# Patient Record
Sex: FEMALE | Race: WHITE | NOT HISPANIC OR LATINO
[De-identification: names, ages, dates, MRNs, and addresses within clinical notes are randomized per-mention and may not be internally consistent; named-entity substitution may affect disease eponyms.]

---

## 2017-07-28 ENCOUNTER — TRANSCRIPTION ENCOUNTER (OUTPATIENT)
Age: 52
End: 2017-07-28

## 2018-05-25 ENCOUNTER — APPOINTMENT (OUTPATIENT)
Dept: ORTHOPEDIC SURGERY | Facility: CLINIC | Age: 53
End: 2018-05-25
Payer: COMMERCIAL

## 2018-05-25 VITALS
SYSTOLIC BLOOD PRESSURE: 125 MMHG | WEIGHT: 116 LBS | HEIGHT: 63 IN | HEART RATE: 80 BPM | DIASTOLIC BLOOD PRESSURE: 83 MMHG | BODY MASS INDEX: 20.55 KG/M2

## 2018-05-25 DIAGNOSIS — Z78.9 OTHER SPECIFIED HEALTH STATUS: ICD-10-CM

## 2018-05-25 DIAGNOSIS — Z82.61 FAMILY HISTORY OF ARTHRITIS: ICD-10-CM

## 2018-05-25 DIAGNOSIS — Z87.891 PERSONAL HISTORY OF NICOTINE DEPENDENCE: ICD-10-CM

## 2018-05-25 PROCEDURE — 97760 ORTHOTIC MGMT&TRAING 1ST ENC: CPT

## 2018-05-25 PROCEDURE — 99204 OFFICE O/P NEW MOD 45 MIN: CPT | Mod: 25

## 2018-05-25 PROCEDURE — 73610 X-RAY EXAM OF ANKLE: CPT | Mod: LT

## 2018-06-12 ENCOUNTER — APPOINTMENT (OUTPATIENT)
Dept: ORTHOPEDIC SURGERY | Facility: CLINIC | Age: 53
End: 2018-06-12
Payer: COMMERCIAL

## 2018-06-12 PROCEDURE — 99214 OFFICE O/P EST MOD 30 MIN: CPT

## 2018-06-18 ENCOUNTER — FORM ENCOUNTER (OUTPATIENT)
Age: 53
End: 2018-06-18

## 2018-06-19 ENCOUNTER — OUTPATIENT (OUTPATIENT)
Dept: OUTPATIENT SERVICES | Facility: HOSPITAL | Age: 53
LOS: 1 days | End: 2018-06-19

## 2018-06-19 ENCOUNTER — OUTPATIENT (OUTPATIENT)
Dept: OUTPATIENT SERVICES | Facility: HOSPITAL | Age: 53
LOS: 1 days | End: 2018-06-19
Payer: COMMERCIAL

## 2018-06-19 ENCOUNTER — APPOINTMENT (OUTPATIENT)
Dept: MRI IMAGING | Facility: CLINIC | Age: 53
End: 2018-06-19
Payer: COMMERCIAL

## 2018-06-19 DIAGNOSIS — Z00.8 ENCOUNTER FOR OTHER GENERAL EXAMINATION: ICD-10-CM

## 2018-06-19 PROCEDURE — 73721 MRI JNT OF LWR EXTRE W/O DYE: CPT | Mod: 26,LT

## 2018-06-19 PROCEDURE — 73721 MRI JNT OF LWR EXTRE W/O DYE: CPT

## 2018-06-28 ENCOUNTER — APPOINTMENT (OUTPATIENT)
Dept: ORTHOPEDIC SURGERY | Facility: CLINIC | Age: 53
End: 2018-06-28
Payer: COMMERCIAL

## 2018-06-28 DIAGNOSIS — S93.402A SPRAIN OF UNSPECIFIED LIGAMENT OF LEFT ANKLE, INITIAL ENCOUNTER: ICD-10-CM

## 2018-06-28 PROCEDURE — 99214 OFFICE O/P EST MOD 30 MIN: CPT

## 2019-08-12 ENCOUNTER — APPOINTMENT (OUTPATIENT)
Dept: ORTHOPEDIC SURGERY | Facility: CLINIC | Age: 54
End: 2019-08-12
Payer: COMMERCIAL

## 2019-08-12 VITALS
HEIGHT: 63 IN | DIASTOLIC BLOOD PRESSURE: 77 MMHG | BODY MASS INDEX: 21.26 KG/M2 | WEIGHT: 120 LBS | SYSTOLIC BLOOD PRESSURE: 132 MMHG | TEMPERATURE: 98.4 F | HEART RATE: 67 BPM

## 2019-08-12 PROCEDURE — 28470 CLTX METATARSAL FX WO MNP EA: CPT | Mod: RT

## 2019-08-12 PROCEDURE — 99214 OFFICE O/P EST MOD 30 MIN: CPT | Mod: 57

## 2019-08-16 NOTE — HISTORY OF PRESENT ILLNESS
[6] : a current pain level of 6/10 [1] : the relief from medicine is 1/10 [7] : the ailment interference is 7/10 [8] : the ailment interference is 8/10 [9] : the ailment interference is 9/10 [10  (interferes completely)] : the ailment interference is10/10 (interferes completely) [de-identified] : She comes in today status post an injury to her right foot over the weekend.  She went [place] a medicenter.  She was pushing off another boat when it moved into them.  She had the onset of pain and swelling.  The patient states the pain is constant and localized.  The patient describes the pain as burning, stinging and throbbing.  The patient states ice and elevation make the symptoms better while walking and standing make the symptoms worse. [] : No [de-identified] : None [de-identified] : Aleve [de-identified] : The patient states she is unstable because she cannot put much weight on right foot.

## 2019-08-16 NOTE — PHYSICAL EXAM
[de-identified] : Left foot:\par Foot: Range of Motion in Degrees:\par 	                                Claimant:	                Normal:	\par Dorsiflexion (Active)	40-degrees	40-degrees	\par Dorsiflexion (Passive)	40-degrees	40-degrees	\par Plantar (Ankle)	                40-degrees	40-degrees	\par Plantar (Passive)	                40-degrees	40-degrees	\par Inversion (Active)	                30-degrees	30-degrees	\par Inversion (Passive)   	30-degrees	30-degrees	\par Eversion  (Active)	                20-degrees	20-degrees	\par Eversion (Passive) 	                20-degrees	20-degrees	\par \par No instability.  No tenderness over the anterolateral ligaments.  No medial-sided tenderness.  No hind, mid or forefoot tenderness.  No proximal fibula tenderness.  Neurologically and vascularly intact distally.  Normal dorsi, plantar flexion, inversion of the foot.  No motor, sensory, reflex or vascular deficits.  2+ DP and PT pulses.  Skin is intact.  No rashes, scars or lesions.\par  \par Right foot:\par Diffuse swelling and tenderness at the base of the second metatarsal.  Limited range of motion secondary to pain.  Skin is intact.  No rashes, scars or lesions.  [de-identified] : Gait: Slightly antalgic to antalgic gait.  Station: Normal  [de-identified] : Appearance: Well-developed, well-nourished female  in no acute distress.  [de-identified] : Outside radiographs reviewed show evidence of widened Lisfranc's joint and nondisplaced fracture base of the second.

## 2019-08-16 NOTE — ADDENDUM
[FreeTextEntry1] : This note was written by Olive Johnson on 08/15/2019 acting as scribe for Francisco Badillo III, MD

## 2019-08-16 NOTE — DISCUSSION/SUMMARY
[de-identified] : The patient presents with a fracture base of the second, right foot.  At this time I recommend a CAM boot, weightbearing as tolerated.  She will be reassessed in two weeks.\par \par I declare responsibility and liability for caring for this fracture for the next 90 days.

## 2019-08-30 ENCOUNTER — APPOINTMENT (OUTPATIENT)
Dept: ORTHOPEDIC SURGERY | Facility: CLINIC | Age: 54
End: 2019-08-30
Payer: COMMERCIAL

## 2019-08-30 VITALS
HEART RATE: 71 BPM | HEIGHT: 63 IN | WEIGHT: 120 LBS | DIASTOLIC BLOOD PRESSURE: 78 MMHG | BODY MASS INDEX: 21.26 KG/M2 | TEMPERATURE: 98.2 F | SYSTOLIC BLOOD PRESSURE: 123 MMHG

## 2019-08-30 PROCEDURE — 73630 X-RAY EXAM OF FOOT: CPT | Mod: TC,RT

## 2019-08-30 PROCEDURE — 99024 POSTOP FOLLOW-UP VISIT: CPT

## 2019-09-09 NOTE — PHYSICAL EXAM
[de-identified] : Right foot: \par Range of motion not assessed secondary to fracture.\par \par \par Right ankle:\par Ankle: Range of Motion in Degrees:\par 	                                Claimant:	                Normal:	\par Dorsiflexion (Active)	40-degrees	40-degrees	\par Dorsiflexion (Passive)	40-degrees	40-degrees	\par Plantar (Active)	                40-degrees	40-degrees	\par Plantar (Passive)	                40-degrees	40-degrees	\par Inversion (Active)	                30-degrees	30-degrees	\par Inversion (Passive)	                30-degrees	30-degrees	\par Eversion  (Active)	                20-degrees	20-degrees	\par Eversion (Passive) 	                20-degrees	20-degrees	\par \par \par No calf tenderness.  Good distal pulses.\par   [de-identified] : X-rays, three views of the right foot, reveal adequate alignment of the fracture.  No new fractures or dislocations noted.

## 2019-09-09 NOTE — HISTORY OF PRESENT ILLNESS
[de-identified] : Magnolia comes in today with a right foot second metatarsal fracture.  The patient states that she is doing well.  She does have some tenderness still.

## 2019-09-09 NOTE — ADDENDUM
[FreeTextEntry1] : This note was written by Olive Johnson on 09/09/2019 acting as scribe for Bibi Sellers, RONNIER/RAMON, PA.\par

## 2019-09-11 PROBLEM — S92.322D: Status: ACTIVE | Noted: 2019-09-11

## 2019-09-12 ENCOUNTER — APPOINTMENT (OUTPATIENT)
Dept: ORTHOPEDIC SURGERY | Facility: CLINIC | Age: 54
End: 2019-09-12
Payer: COMMERCIAL

## 2019-09-12 DIAGNOSIS — S92.322D DISPLACED FRACTURE OF SECOND METATARSAL BONE, LEFT FOOT, SUBSEQUENT ENCOUNTER FOR FRACTURE WITH ROUTINE HEALING: ICD-10-CM

## 2019-09-19 ENCOUNTER — APPOINTMENT (OUTPATIENT)
Dept: ORTHOPEDIC SURGERY | Facility: CLINIC | Age: 54
End: 2019-09-19
Payer: COMMERCIAL

## 2019-09-19 ENCOUNTER — TRANSCRIPTION ENCOUNTER (OUTPATIENT)
Age: 54
End: 2019-09-19

## 2019-09-19 VITALS
SYSTOLIC BLOOD PRESSURE: 128 MMHG | HEART RATE: 75 BPM | BODY MASS INDEX: 21.26 KG/M2 | HEIGHT: 63 IN | TEMPERATURE: 97.9 F | DIASTOLIC BLOOD PRESSURE: 75 MMHG | WEIGHT: 120 LBS

## 2019-09-19 DIAGNOSIS — S92.324A NONDISPLACED FRACTURE OF SECOND METATARSAL BONE, RIGHT FOOT, INITIAL ENCOUNTER FOR CLOSED FRACTURE: ICD-10-CM

## 2019-09-19 PROCEDURE — 99024 POSTOP FOLLOW-UP VISIT: CPT

## 2019-09-19 PROCEDURE — 73620 X-RAY EXAM OF FOOT: CPT | Mod: RT

## 2019-09-26 NOTE — DISCUSSION/SUMMARY
[de-identified] : At this time, since the patient is status post fracture of second metatarsal of right foot, she will wean off the brace and be reassessed in 4-6 weeks.\par

## 2019-09-26 NOTE — ADDENDUM
[FreeTextEntry1] : This note was written by Sha Soler on 09/25/2019, acting as a scribe for Francisco Badillo III, MD

## 2019-09-26 NOTE — PHYSICAL EXAM
[de-identified] : Radiographs, two views of the right foot, show acceptable position. [de-identified] : Right Foot:  Mildly tender over the second metatarsal.

## 2019-09-26 NOTE — HISTORY OF PRESENT ILLNESS
[de-identified] : The patient comes in today for her right foot.  She states she is still having some pain, but feeling much better.

## 2020-09-21 DIAGNOSIS — M47.816 SPONDYLOSIS W/OUT MYELOPATHY OR RADICULOPATHY, LUMBAR REGION: ICD-10-CM

## 2020-09-22 ENCOUNTER — APPOINTMENT (OUTPATIENT)
Dept: ORTHOPEDIC SURGERY | Facility: CLINIC | Age: 55
End: 2020-09-22

## 2020-10-13 NOTE — DISCUSSION/SUMMARY
[de-identified] : The patient presents with a closed fracture of the second metatarsal, right foot.  The patient is advised to keep the boot on and return to the office in two weeks.   64

## 2023-01-05 ENCOUNTER — APPOINTMENT (OUTPATIENT)
Dept: ORTHOPEDIC SURGERY | Facility: CLINIC | Age: 58
End: 2023-01-05
Payer: COMMERCIAL

## 2023-01-05 VITALS — HEIGHT: 63 IN | WEIGHT: 120 LBS | BODY MASS INDEX: 21.26 KG/M2

## 2023-01-05 PROCEDURE — 73562 X-RAY EXAM OF KNEE 3: CPT | Mod: 50

## 2023-01-05 PROCEDURE — 99203 OFFICE O/P NEW LOW 30 MIN: CPT

## 2023-01-05 RX ORDER — NAPROXEN 500 MG/1
500 TABLET ORAL
Qty: 20 | Refills: 0 | Status: COMPLETED | COMMUNITY
Start: 2018-05-21 | End: 2023-01-05

## 2023-01-05 RX ORDER — FLUCONAZOLE 150 MG/1
150 TABLET ORAL
Qty: 2 | Refills: 0 | Status: COMPLETED | COMMUNITY
Start: 2018-02-09 | End: 2023-01-05

## 2023-01-05 NOTE — HISTORY OF PRESENT ILLNESS
[Gradual] : gradual [8] : 8 [1] : 2 [Sharp] : sharp [Stabbing] : stabbing [Intermittent] : intermittent [Leisure] : leisure [Rest] : rest [Exercising] : exercising [Stairs] : stairs [Full time] : Work status: full time [de-identified] : Patient is a 57-year-old female with chronic bilateral knee pain.  She has history of Oats procedure with allograft right knee in 2013.  She is active.  She plays tennis several days a week, and singles and doubles.  Over the past several months she's had increased pain in both her knees.  She has mild to moderate pain standing and walking.  Moderate sharp pain in her right knee with sudden movements.  Pain in her left knee awakens her from sleep at night.  No swelling.  Taking Aleve p.r.n.  Referred by her friend Jerel Kaye for evaluation [] : Post Surgical Visit: no [de-identified] : 2013 [de-identified] : Dr Leif Lee  [de-identified] :

## 2023-01-05 NOTE — DISCUSSION/SUMMARY
[de-identified] : The patient will have MRIs of both knees\par I discussed possible Gelsyn injections which may be the preferred brand of her insurance company. I gave her a pamphlet. Risks benefits and the alternatives were discussed. I also discussed possible TriVisc injections and gave her a pamphlet if her insurance will not cover the injections\par She would like to try the Gelsyn injections were TriVisc injections if Hyaluronate injections are not apporoved by her insurance\par Ice p.r.n.\par Aleve p.r.n.\par Continue home exercises\par \par Impression:\par Mild to moderate osteoarthritis right knee/chondromalacia patella \par Mild to moderate osteoarthritis left knee/chondromalacia patella\par \par \par

## 2023-01-08 ENCOUNTER — FORM ENCOUNTER (OUTPATIENT)
Age: 58
End: 2023-01-08

## 2023-01-09 ENCOUNTER — APPOINTMENT (OUTPATIENT)
Dept: MRI IMAGING | Facility: CLINIC | Age: 58
End: 2023-01-09
Payer: COMMERCIAL

## 2023-01-09 PROCEDURE — 73721 MRI JNT OF LWR EXTRE W/O DYE: CPT | Mod: RT

## 2023-01-09 PROCEDURE — 73721 MRI JNT OF LWR EXTRE W/O DYE: CPT | Mod: LT

## 2023-01-11 ENCOUNTER — NON-APPOINTMENT (OUTPATIENT)
Age: 58
End: 2023-01-11

## 2023-01-16 ENCOUNTER — APPOINTMENT (OUTPATIENT)
Dept: ORTHOPEDIC SURGERY | Facility: CLINIC | Age: 58
End: 2023-01-16
Payer: COMMERCIAL

## 2023-01-16 VITALS — HEIGHT: 63 IN | WEIGHT: 120 LBS | BODY MASS INDEX: 21.26 KG/M2

## 2023-01-16 PROCEDURE — 99214 OFFICE O/P EST MOD 30 MIN: CPT

## 2023-01-16 NOTE — DISCUSSION/SUMMARY
[Medication Risks Reviewed] : Medication risks reviewed [de-identified] : MRIs both knees were discussed with the patient\par Again, I discussed possible Gelsyn injections which may be the preferred brand of her insurance company. Risks benefits and the alternatives were discussed. I also discussed possible TriVisc injections if her insurance will not cover the injections\par She would like to try the Gelsyn injections or TriVisc injections if Hyaluronate injections are not apporoved by her insurance\par Ice p.r.n.\par Discontinue Aleve.  Try meloxicam 15 mg q.d. with food p.r.n.\par Continue home exercises\par Recommend she cut back on her sports until feeling better\par \par Impression:\par Mild to moderate osteoarthritis right knee/chondromalacia patella \par Mild to moderate osteoarthritis left knee/chondromalacia patella/tear medial meniscus\par \par \par

## 2023-01-16 NOTE — DATA REVIEWED
[MRI] : MRI [Bilateral] : of the bilateral [Knee] : knee [I independently reviewed and interpreted images and report] : I independently reviewed and interpreted images and report [FreeTextEntry1] : MRI right knee in January 9, 2023 was reviewed. There are degenerative changes of the medial compartment and patellofemoral joint. Irregularity posterior horn medial meniscus, postsurgical changes versus new tear. Mild subchondral edema medial femoral condyle.\par \par MRI left knee done January 9, 2023 was reviewed. There are tricompartmental degenerative changes most significant in the medial compartment. Complex tear medial meniscus \par

## 2023-01-16 NOTE — IMAGING
[de-identified] : Normal gait\par \par Right knee\par No swelling\par Mild medial facet and joint line tenderness\par Passive range of motion 0° to 125°\par Ligaments are stable\par Quad strength 5/5\par \par Left knee\par No swelling\par Mild medial facet and joint line tenderness\par Passive range of motion 0° to 125°\par Ligaments are stable\par Quad strength 5/5\par \par Both legs\par No swelling\par Calves are soft and nontender\par Posterior tibial pulse 2+ bilaterally

## 2023-01-16 NOTE — HISTORY OF PRESENT ILLNESS
[Gradual] : gradual [3] : 3 [Dull/Aching] : dull/aching [Intermittent] : intermittent [Standing] : standing [Walking] : walking [Stairs] : stairs [Lying in bed] : lying in bed [Full time] : Work status: full time [de-identified] : The patient has continued pain in both knees.  She has mild pain standing and walking.  Mild pain after tennis.  Her most significant issue is pain at night time.  Occasional awakening.  Left knee bothers her more than the right.  Taking Aleve p.r.n. she had MRIs both knees [] : Post Surgical Visit: no [de-identified] : 01/05/23 [de-identified] : Dr. Payan [de-identified] : MRI

## 2023-02-09 ENCOUNTER — APPOINTMENT (OUTPATIENT)
Dept: ORTHOPEDIC SURGERY | Facility: CLINIC | Age: 58
End: 2023-02-09
Payer: COMMERCIAL

## 2023-02-09 VITALS — BODY MASS INDEX: 21.26 KG/M2 | HEIGHT: 63 IN | WEIGHT: 120 LBS

## 2023-02-09 PROCEDURE — 20611 DRAIN/INJ JOINT/BURSA W/US: CPT | Mod: 50

## 2023-02-09 RX ORDER — HYALURONATE SODIUM 16.8MG/2ML
16.8 SYRINGE (ML) INTRAARTICULAR
Refills: 0 | Status: COMPLETED | OUTPATIENT
Start: 2023-02-09

## 2023-02-09 RX ADMIN — Medication MG/2ML: at 00:00

## 2023-02-09 NOTE — HISTORY OF PRESENT ILLNESS
[Gradual] : gradual [3] : 3 [Dull/Aching] : dull/aching [Intermittent] : intermittent [Rest] : rest [Stairs] : stairs [Full time] : Work status: full time [1] : 1 [Gelsyn] : Gelsyn [de-identified] : The patient has continued mild pain in both knees standing and walking.  Mild to moderate pain after tennis.  Pain sleeping has subsided since she has been taking meloxicam [] : Post Surgical Visit: no [de-identified] : 1/16/2023 [de-identified] : Dr. Payan  [de-identified] :

## 2023-02-09 NOTE — PROCEDURE
[Large Joint Injection] : Large joint injection [Bilateral] : bilaterally of the [Knee] : knee [Pain] : pain [Alcohol] : alcohol [Betadine] : betadine [Ethyl Chloride sprayed topically] : ethyl chloride sprayed topically [Sterile technique used] : sterile technique used [Gel-Syn (16.8mg)] : 16.8mg of Gel-Syn [#1] : series #1 [] : Patient tolerated procedure well [Patient was advised to rest the joint(s) for ____ days] : patient was advised to rest the joint(s) for [unfilled] days [Risks, benefits, alternatives discussed / Verbal consent obtained] : the risks benefits, and alternatives have been discussed, and verbal consent was obtained [All ultrasound images have been permanently captured and stored accordingly in our picture archiving and communication system] : All ultrasound images have been permanently captured and stored accordingly in our picture archiving and communication system [de-identified] : To ensure intra-articular injections [FreeTextEntry3] : Do not submerge underwater for 24 hours

## 2023-02-09 NOTE — DISCUSSION/SUMMARY
[Medication Risks Reviewed] : Medication risks reviewed [de-identified] : Gelsyn injections were approved by her insurance company.  Risk benefits and alternatives were discussed\par Ice p.r.n.\par Continue meloxicam 15 mg q.d. with food p.r.n.\par Continue home exercises\par Recommend she cut back on her sports until feeling better\par \par Impression:\par Mild to moderate osteoarthritis right knee/chondromalacia patella \par Mild to moderate osteoarthritis left knee/chondromalacia patella/tear medial meniscus\par \par \par

## 2023-02-16 ENCOUNTER — APPOINTMENT (OUTPATIENT)
Dept: ORTHOPEDIC SURGERY | Facility: CLINIC | Age: 58
End: 2023-02-16
Payer: COMMERCIAL

## 2023-02-16 VITALS — HEIGHT: 63 IN | BODY MASS INDEX: 21.26 KG/M2 | WEIGHT: 120 LBS

## 2023-02-16 PROCEDURE — 20611 DRAIN/INJ JOINT/BURSA W/US: CPT | Mod: 50

## 2023-02-16 RX ORDER — HYALURONATE SODIUM 16.8MG/2ML
16.8 SYRINGE (ML) INTRAARTICULAR
Refills: 0 | Status: COMPLETED | OUTPATIENT
Start: 2023-02-16

## 2023-02-16 RX ADMIN — Medication MG/2ML: at 00:00

## 2023-02-16 NOTE — PROCEDURE
[Large Joint Injection] : Large joint injection [Bilateral] : bilaterally of the [Knee] : knee [Pain] : pain [Alcohol] : alcohol [Betadine] : betadine [Ethyl Chloride sprayed topically] : ethyl chloride sprayed topically [Sterile technique used] : sterile technique used [Gel-Syn (16.8mg)] : 16.8mg of Gel-Syn [#2] : series #2 [] : Patient tolerated procedure well [Patient was advised to rest the joint(s) for ____ days] : patient was advised to rest the joint(s) for [unfilled] days [Risks, benefits, alternatives discussed / Verbal consent obtained] : the risks benefits, and alternatives have been discussed, and verbal consent was obtained [All ultrasound images have been permanently captured and stored accordingly in our picture archiving and communication system] : All ultrasound images have been permanently captured and stored accordingly in our picture archiving and communication system [de-identified] : To ensure intra-articular injections [FreeTextEntry3] : Do not submerge underwater for 24 hours

## 2023-02-16 NOTE — IMAGING
[de-identified] : Normal gait\par \par Right knee\par No swelling\par Mild medial facet and joint line tenderness\par Passive range of motion 0° to 125°\par \par Left knee\par No swelling\par Mild medial facet and joint line tenderness\par Passive range of motion 0° to 125°\par \par Both legs\par No swelling\par Calves are soft and nontender\par

## 2023-02-16 NOTE — DISCUSSION/SUMMARY
[Medication Risks Reviewed] : Medication risks reviewed [de-identified] : Ice p.r.n.\par Continue meloxicam 15 mg q.d. with food p.r.n.\par Continue home exercises\par Recommend she cut back on her sports until feeling better\par She is going to visit her son in [place], [place] next week\par \par Impression:\par Mild to moderate osteoarthritis right knee/chondromalacia patella \par Mild to moderate osteoarthritis left knee/chondromalacia patella/tear medial meniscus\par \par \par

## 2023-02-16 NOTE — HISTORY OF PRESENT ILLNESS
[Gradual] : gradual [3] : 3 [Dull/Aching] : dull/aching [Rest] : rest [Stairs] : stairs [Full time] : Work status: full time [de-identified] : The patient has continued pain in both knees.  Mild to moderate pain after playing tennis.  Mild pain standing and walking intermittently.  No change after the first Gelsyn injections [] : Post Surgical Visit: no [de-identified] :

## 2023-02-28 ENCOUNTER — APPOINTMENT (OUTPATIENT)
Dept: ORTHOPEDIC SURGERY | Facility: CLINIC | Age: 58
End: 2023-02-28
Payer: COMMERCIAL

## 2023-02-28 VITALS — HEIGHT: 63 IN | BODY MASS INDEX: 21.26 KG/M2 | WEIGHT: 120 LBS

## 2023-02-28 PROCEDURE — 20611 DRAIN/INJ JOINT/BURSA W/US: CPT | Mod: 50

## 2023-02-28 RX ORDER — HYALURONATE SODIUM 16.8MG/2ML
16.8 SYRINGE (ML) INTRAARTICULAR
Refills: 0 | Status: COMPLETED | OUTPATIENT
Start: 2023-02-28

## 2023-02-28 RX ADMIN — Medication MG/2ML: at 00:00

## 2023-02-28 NOTE — HISTORY OF PRESENT ILLNESS
[Gradual] : gradual [Dull/Aching] : dull/aching [Intermittent] : intermittent [Stairs] : stairs [Full time] : Work status: full time [3] : 3 [Gelsyn] : Gelsyn [de-identified] : The patient has continued pain in both knees.  Mild pain standing and walking.  Mild to moderate pain after tennis.  No change after the second Gelsyn injections [] : Post Surgical Visit: no [de-identified] :   [de-identified] : 2/16/2023

## 2023-02-28 NOTE — DISCUSSION/SUMMARY
[Medication Risks Reviewed] : Medication risks reviewed [de-identified] : Ice p.r.n.\par Continue meloxicam 15 mg q.d. with food p.r.n.\par Continue home exercises\par Recommend she cut back on her sports until feeling better\par Followup in 2 months if persistent problems, otherwise p.r.n.\par \par Impression:\par Mild to moderate osteoarthritis right knee/chondromalacia patella \par Mild to moderate osteoarthritis left knee/chondromalacia patella/tear medial meniscus\par \par \par

## 2023-02-28 NOTE — PROCEDURE
[Large Joint Injection] : Large joint injection [Bilateral] : bilaterally of the [Knee] : knee [Pain] : pain [Alcohol] : alcohol [Betadine] : betadine [Ethyl Chloride sprayed topically] : ethyl chloride sprayed topically [Sterile technique used] : sterile technique used [Gel-Syn (16.8mg)] : 16.8mg of Gel-Syn [] : Patient tolerated procedure well [Patient was advised to rest the joint(s) for ____ days] : patient was advised to rest the joint(s) for [unfilled] days [Risks, benefits, alternatives discussed / Verbal consent obtained] : the risks benefits, and alternatives have been discussed, and verbal consent was obtained [All ultrasound images have been permanently captured and stored accordingly in our picture archiving and communication system] : All ultrasound images have been permanently captured and stored accordingly in our picture archiving and communication system [#3] : series #3 [de-identified] : To ensure intra-articular injections [FreeTextEntry3] : Do not submerge underwater for 24 hours

## 2023-02-28 NOTE — IMAGING
[de-identified] : Normal gait\par \par Right knee\par No swelling\par Mild medial facet and joint line tenderness\par Passive range of motion 0° to 125°\par \par Left knee\par No swelling\par Mild medial facet and joint line tenderness\par Passive range of motion 0° to 125°\par \par Both legs\par No swelling\par Calves are soft and nontender\par

## 2023-03-22 ENCOUNTER — RX RENEWAL (OUTPATIENT)
Age: 58
End: 2023-03-22

## 2023-05-24 ENCOUNTER — RX RENEWAL (OUTPATIENT)
Age: 58
End: 2023-05-24

## 2023-08-29 NOTE — IMAGING
[de-identified] : Normal gait\par \par Right knee\par No swelling\par Mild medial facet and joint line tenderness\par Passive range of motion 0° to 125°\par \par Left knee\par No swelling\par Mild medial facet and joint line tenderness\par Passive range of motion 0° to 125°\par \par Both legs\par No swelling\par Calves are soft and nontender\par  Wound Care: Petrolatum

## 2023-10-02 ENCOUNTER — APPOINTMENT (OUTPATIENT)
Dept: ORTHOPEDIC SURGERY | Facility: CLINIC | Age: 58
End: 2023-10-02
Payer: COMMERCIAL

## 2023-10-02 VITALS — BODY MASS INDEX: 21.26 KG/M2 | WEIGHT: 120 LBS | HEIGHT: 63 IN

## 2023-10-02 DIAGNOSIS — S83.232D COMPLEX TEAR OF MEDIAL MENISCUS, CURRENT INJURY, LEFT KNEE, SUBSEQUENT ENCOUNTER: ICD-10-CM

## 2023-10-02 PROCEDURE — 99214 OFFICE O/P EST MOD 30 MIN: CPT

## 2023-10-02 RX ORDER — MELOXICAM 15 MG/1
15 TABLET ORAL
Qty: 30 | Refills: 1 | Status: COMPLETED | COMMUNITY
Start: 2023-01-16 | End: 2023-10-02

## 2023-10-05 ENCOUNTER — APPOINTMENT (OUTPATIENT)
Dept: MRI IMAGING | Facility: CLINIC | Age: 58
End: 2023-10-05
Payer: COMMERCIAL

## 2023-10-05 PROCEDURE — 73721 MRI JNT OF LWR EXTRE W/O DYE: CPT | Mod: RT

## 2023-10-09 ENCOUNTER — NON-APPOINTMENT (OUTPATIENT)
Age: 58
End: 2023-10-09

## 2023-10-12 ENCOUNTER — APPOINTMENT (OUTPATIENT)
Dept: ORTHOPEDIC SURGERY | Facility: CLINIC | Age: 58
End: 2023-10-12
Payer: COMMERCIAL

## 2023-10-12 VITALS — WEIGHT: 120 LBS | BODY MASS INDEX: 21.26 KG/M2 | HEIGHT: 63 IN

## 2023-10-12 DIAGNOSIS — M17.11 UNILATERAL PRIMARY OSTEOARTHRITIS, RIGHT KNEE: ICD-10-CM

## 2023-10-12 DIAGNOSIS — R93.6 ABNORMAL FINDINGS ON DIAGNOSTIC IMAGING OF LIMBS: ICD-10-CM

## 2023-10-12 DIAGNOSIS — M22.41 CHONDROMALACIA PATELLAE, RIGHT KNEE: ICD-10-CM

## 2023-10-12 DIAGNOSIS — M22.42 CHONDROMALACIA PATELLAE, LEFT KNEE: ICD-10-CM

## 2023-10-12 DIAGNOSIS — M76.31 ILIOTIBIAL BAND SYNDROME, RIGHT LEG: ICD-10-CM

## 2023-10-12 PROCEDURE — 99214 OFFICE O/P EST MOD 30 MIN: CPT

## 2023-10-17 RX ORDER — HYALURONATE SODIUM 16.8MG/2ML
16.8 SYRINGE (ML) INTRAARTICULAR
Qty: 6 | Refills: 0 | Status: ACTIVE | OUTPATIENT
Start: 2023-10-17

## 2023-10-30 ENCOUNTER — APPOINTMENT (OUTPATIENT)
Dept: ORTHOPEDIC SURGERY | Facility: CLINIC | Age: 58
End: 2023-10-30
Payer: COMMERCIAL

## 2023-10-30 VITALS — HEIGHT: 63 IN | BODY MASS INDEX: 21.26 KG/M2 | WEIGHT: 120 LBS

## 2023-10-30 PROCEDURE — 99213 OFFICE O/P EST LOW 20 MIN: CPT | Mod: 25

## 2023-10-30 PROCEDURE — 20611 DRAIN/INJ JOINT/BURSA W/US: CPT | Mod: 50

## 2023-10-30 RX ORDER — HYALURONATE SODIUM 16.8MG/2ML
16.8 SYRINGE (ML) INTRAARTICULAR
Refills: 0 | Status: COMPLETED | OUTPATIENT
Start: 2023-10-30

## 2023-10-30 RX ADMIN — Medication MG/2ML: at 00:00

## 2023-11-07 ENCOUNTER — APPOINTMENT (OUTPATIENT)
Dept: ORTHOPEDIC SURGERY | Facility: CLINIC | Age: 58
End: 2023-11-07
Payer: COMMERCIAL

## 2023-11-07 VITALS — BODY MASS INDEX: 21.26 KG/M2 | WEIGHT: 120 LBS | HEIGHT: 63 IN

## 2023-11-07 PROCEDURE — 20611 DRAIN/INJ JOINT/BURSA W/US: CPT | Mod: 50

## 2023-11-07 RX ORDER — HYALURONATE SODIUM 16.8MG/2ML
16.8 SYRINGE (ML) INTRAARTICULAR
Refills: 0 | Status: COMPLETED | OUTPATIENT
Start: 2023-11-07

## 2023-11-07 RX ADMIN — Medication MG/2ML: at 00:00

## 2023-11-16 ENCOUNTER — APPOINTMENT (OUTPATIENT)
Dept: ORTHOPEDIC SURGERY | Facility: CLINIC | Age: 58
End: 2023-11-16
Payer: COMMERCIAL

## 2023-11-16 VITALS — WEIGHT: 120 LBS | HEIGHT: 63 IN | BODY MASS INDEX: 21.26 KG/M2

## 2023-11-16 PROCEDURE — 20611 DRAIN/INJ JOINT/BURSA W/US: CPT | Mod: 50

## 2023-11-16 RX ORDER — HYALURONATE SODIUM 16.8MG/2ML
16.8 SYRINGE (ML) INTRAARTICULAR
Refills: 0 | Status: COMPLETED | OUTPATIENT
Start: 2023-11-16

## 2023-11-16 RX ADMIN — Medication MG/2ML: at 00:00

## 2023-12-01 ENCOUNTER — EMERGENCY (EMERGENCY)
Facility: HOSPITAL | Age: 58
LOS: 0 days | Discharge: HOME | End: 2023-12-02
Attending: EMERGENCY MEDICINE
Payer: COMMERCIAL

## 2023-12-01 VITALS
HEART RATE: 79 BPM | TEMPERATURE: 98 F | HEIGHT: 65 IN | DIASTOLIC BLOOD PRESSURE: 80 MMHG | WEIGHT: 130.07 LBS | OXYGEN SATURATION: 100 % | SYSTOLIC BLOOD PRESSURE: 143 MMHG | RESPIRATION RATE: 16 BRPM
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
## 2023-12-01 DIAGNOSIS — S01.81XA LACERATION WITHOUT FOREIGN BODY OF OTHER PART OF HEAD, INITIAL ENCOUNTER: ICD-10-CM

## 2023-12-01 DIAGNOSIS — Z23 ENCOUNTER FOR IMMUNIZATION: ICD-10-CM

## 2023-12-01 DIAGNOSIS — R51.9 HEADACHE, UNSPECIFIED: ICD-10-CM

## 2023-12-01 DIAGNOSIS — S06.0X0A CONCUSSION WITHOUT LOSS OF CONSCIOUSNESS, INITIAL ENCOUNTER: ICD-10-CM

## 2023-12-01 PROCEDURE — 70450 CT HEAD/BRAIN W/O DYE: CPT | Mod: MA

## 2023-12-01 PROCEDURE — 73130 X-RAY EXAM OF HAND: CPT | Mod: RT

## 2023-12-01 PROCEDURE — 70450 CT HEAD/BRAIN W/O DYE: CPT | Mod: 26,MA

## 2023-12-01 PROCEDURE — 72125 CT NECK SPINE W/O DYE: CPT | Mod: MA

## 2023-12-01 PROCEDURE — 73140 X-RAY EXAM OF FINGER(S): CPT | Mod: RT

## 2023-12-01 PROCEDURE — 73130 X-RAY EXAM OF HAND: CPT | Mod: 26,RT

## 2023-12-01 PROCEDURE — 73140 X-RAY EXAM OF FINGER(S): CPT | Mod: 26,59,RT

## 2023-12-01 PROCEDURE — 90471 IMMUNIZATION ADMIN: CPT

## 2023-12-01 PROCEDURE — 12013 RPR F/E/E/N/L/M 2.6-5.0 CM: CPT

## 2023-12-01 PROCEDURE — 99284 EMERGENCY DEPT VISIT MOD MDM: CPT | Mod: 25

## 2023-12-01 PROCEDURE — 90715 TDAP VACCINE 7 YRS/> IM: CPT

## 2023-12-01 PROCEDURE — 72125 CT NECK SPINE W/O DYE: CPT | Mod: 26,MA

## 2023-12-01 RX ORDER — ACETAMINOPHEN 500 MG
975 TABLET ORAL ONCE
Refills: 0 | Status: COMPLETED | OUTPATIENT
Start: 2023-12-01 | End: 2023-12-01

## 2023-12-01 RX ORDER — TETANUS TOXOID, REDUCED DIPHTHERIA TOXOID AND ACELLULAR PERTUSSIS VACCINE, ADSORBED 5; 2.5; 8; 8; 2.5 [IU]/.5ML; [IU]/.5ML; UG/.5ML; UG/.5ML; UG/.5ML
0.5 SUSPENSION INTRAMUSCULAR ONCE
Refills: 0 | Status: COMPLETED | OUTPATIENT
Start: 2023-12-01 | End: 2023-12-01

## 2023-12-01 RX ADMIN — TETANUS TOXOID, REDUCED DIPHTHERIA TOXOID AND ACELLULAR PERTUSSIS VACCINE, ADSORBED 0.5 MILLILITER(S): 5; 2.5; 8; 8; 2.5 SUSPENSION INTRAMUSCULAR at 23:10

## 2023-12-01 RX ADMIN — Medication 975 MILLIGRAM(S): at 23:08

## 2023-12-01 NOTE — ED ADULT TRIAGE NOTE - CHIEF COMPLAINT QUOTE
Pt BIBEMS s/p fall after playing tennis. Pt states she collided with another player, they bumped hips, and she fell to the ground. Endorsed head strike, +LOC. Laceration noted to L eyebrow and R wrist. C/O R wrist pain. Denies blood thinners. MD Arora in [facility] [place] assessing patient. NA called 2040. Brought directly to CT.

## 2023-12-01 NOTE — ED PROVIDER NOTE - PROGRESS NOTE DETAILS
concussion talk given acetaminophen for pain wound care precautions given patient and patient's  agree to plan of care

## 2023-12-01 NOTE — ED PROVIDER NOTE - CLINICAL SUMMARY MEDICAL DECISION MAKING FREE TEXT BOX
patient feeling better ambulating no acute distress concussion talk given return to ED for intractable headache persistent vomiting new onset motor or sensory deficits

## 2023-12-01 NOTE — ED PROVIDER NOTE - ENDOCRINE NEGATIVE STATEMENT, MLM
[TextEntry] : Chest x-ray from 12/2/14 and 10/11/21 were negative by report.  Home PSG from 9/24/21 revealed mild LAYO with an AHI of 9.5 but with significant desaturation.
no diabetes and no thyroid trouble.

## 2023-12-01 NOTE — ED PROVIDER NOTE - PATIENT PORTAL LINK FT
You can access the FollowMyHealth Patient Portal offered by [facility] by registering at the following website: [email or web address]. By joining [employer]’s FollowMyHealth portal, you will also be able to view your health information using other applications (apps) compatible with our system. You can access the FollowMyHealth Patient Portal offered by [facility] by registering at the following website: [email or web address]. By joining [employer]’s FollowMyHealth portal, you will also be able to view your health information using other applications (apps) compatible with our system. You can access the FollowMyHealth Patient Portal offered by [facility] by registering at the following website: [email or web address]. By joining [employer]’s FollowMyHealth portal, you will also be able to view your health information using other applications (apps) compatible with our system.

## 2023-12-01 NOTE — ED PROVIDER NOTE - OBJECTIVE STATEMENT
Patient presents to ED status post head collision while playing tennis complaining of generalized achy nondraining headache also with left frontal forehead laceration.  No blood thinners.  No vomiting or loss of consciousness.  No chest pain or shortness of breath headache generalized achy nonradiating no abdominal pain no motor or sensory deficits acting appropriate per

## 2023-12-02 NOTE — ED PROCEDURE NOTE - CPROC ED TIME OUT STATEMENT1
“Patient's name, , procedure and correct site were confirmed during the [place] Timeout.” “Patient's name, , procedure and correct site were confirmed during the [place] Timeout.” “Patient's name, , procedure and correct site were confirmed during the [place] Timeout.”

## 2023-12-02 NOTE — ED ADULT NURSE NOTE - CHIEF COMPLAINT QUOTE
Pt BIBEMS s/p fall after playing tennis. Pt states she collided with another player, they bumped hips, and she fell to the ground. Endorsed head strike, +LOC. Laceration noted to L eyebrow and R wrist. C/O R wrist pain. Denies blood thinners. MD Arora in [facility] [place] assessing patient. NA called 2040. Brought directly to CT.

## 2024-05-09 ENCOUNTER — APPOINTMENT (OUTPATIENT)
Dept: ORTHOPEDIC SURGERY | Facility: CLINIC | Age: 59
End: 2024-05-09
Payer: COMMERCIAL

## 2024-05-09 VITALS — HEIGHT: 63 IN | BODY MASS INDEX: 21.26 KG/M2 | WEIGHT: 120 LBS

## 2024-05-09 DIAGNOSIS — M17.0 BILATERAL PRIMARY OSTEOARTHRITIS OF KNEE: ICD-10-CM

## 2024-05-09 DIAGNOSIS — M76.892 OTHER SPECIFIED ENTHESOPATHIES OF LEFT LOWER LIMB, EXCLUDING FOOT: ICD-10-CM

## 2024-05-09 PROCEDURE — 99214 OFFICE O/P EST MOD 30 MIN: CPT | Mod: 25

## 2024-05-09 PROCEDURE — 73562 X-RAY EXAM OF KNEE 3: CPT | Mod: 50

## 2024-05-09 PROCEDURE — 73502 X-RAY EXAM HIP UNI 2-3 VIEWS: CPT

## 2024-05-09 RX ORDER — MELOXICAM 15 MG/1
15 TABLET ORAL
Qty: 30 | Refills: 1 | Status: ACTIVE | COMMUNITY
Start: 2024-05-09 | End: 1900-01-01

## 2024-05-09 NOTE — IMAGING
[de-identified] : Normal gait  Lumbosacral spine Inspection-normal Tenderness-no tenderness Straight leg raising-negative bilaterally  Right hip Full painless passive range of motion. No tenderness  Left hip Full painless passive range of motion Mild tenderness over the abductor insertion Abductors 4+/5  Both legs No swelling Calves are soft and nontender Posterior tibial pulse 2+ bilaterally  Right knee No swelling Mild medial facet and joint line tenderness.  Mild lateral joint line tenderness.   Passive range of motion 0 to 125 degrees Ligaments are stable Quad strength 5/5  Left knee No swelling Mild medial facet.  Mild medial lateral joint line tenderness Passive range of motion 0 to 125 degrees Ligaments are stable Quad strength 5 -/5  Both legs No swelling Calves are soft and nontender Posterior tibial pulse 2+ bilaterally  [Left] : left hip with pelvis [FreeTextEntry9] : Reviewed and interpreted.  Right knee AP standing, lateral, sunrise views-mild to moderate tricompartmental degenerative changes  Reviewed and interpreted.  Left knee AP standing, lateral, sunrise views-mild to moderate tricompartmental degenerative changes

## 2024-05-09 NOTE — DISCUSSION/SUMMARY
[Medication Risks Reviewed] : Medication risks reviewed [de-identified] : Ice p.r.n. for her knees Moist heat prn for her hip Continue meloxicam 15 mg q.d. with food p.r.n. Risk benefits and alternatives of prescribed medication(s) were discussed with the patient.  Side effects were discussed and questions were answered.  Discontinue medication if any issues.  To call me if any questions or concerns Continue home exercises She did not want to do formal PT I discussed repeat Gelsyn injections for both knees.  Risks including infection, swelling, stiffness, bleeding in addition to other associated risks with joint injection, benefits and alternatives were discussed with the patient She would like to do this  Impression: Abductor tendinitis left hip Mild to moderate osteoarthritis right knee/chondromalacia patella.  Status post Oats procedure 2013 Mild to moderate osteoarthritis left knee/chondromalacia patella/tear medial meniscus

## 2024-05-09 NOTE — REASON FOR VISIT
[FreeTextEntry2] : Increased pain in both knees over the past few weeks Pain left lateral hip over the past 2 months

## 2024-05-09 NOTE — HISTORY OF PRESENT ILLNESS
[Gradual] : gradual [10] : 10 [Dull/Aching] : dull/aching [Sharp] : sharp [Intermittent] : intermittent [Injection therapy] : injection therapy [de-identified] : The patient has had increased pain in her knees over the past few weeks.  The left knee bothers her more than the right.  She has mild pain standing and walking.  Mild to moderate pain with stairs and movie sign.  She has pain in the left knee which awakens her from sleep at night.  Doing home exercises.  Taking meloxicam prn.  She did have good improvement after previous Gelsyn injections in both knees. Over the past 2 months she has had intermittent pain left lateral hip.  No injury.  Mild occasional pain standing and walking.  Mild pain with stairs.  Mild pain after sitting and getting up.  Pain when lying on her side.  No back pain or radicular complaints [] : Post Surgical Visit: no [FreeTextEntry7] : R Leg [de-identified] : 11/16/2023 [de-identified] : Dr. Payan [de-identified] : 11/16/2023

## 2024-06-03 ENCOUNTER — APPOINTMENT (OUTPATIENT)
Dept: ORTHOPEDIC SURGERY | Facility: CLINIC | Age: 59
End: 2024-06-03

## 2024-07-29 ENCOUNTER — APPOINTMENT (OUTPATIENT)
Dept: ORTHOPEDIC SURGERY | Facility: CLINIC | Age: 59
End: 2024-07-29
Payer: COMMERCIAL

## 2024-07-29 VITALS — BODY MASS INDEX: 21.26 KG/M2 | WEIGHT: 120 LBS | HEIGHT: 63 IN

## 2024-07-29 DIAGNOSIS — M76.891 OTHER SPECIFIED ENTHESOPATHIES OF RIGHT LOWER LIMB, EXCLUDING FOOT: ICD-10-CM

## 2024-07-29 PROCEDURE — 20611 DRAIN/INJ JOINT/BURSA W/US: CPT | Mod: 50

## 2024-07-29 PROCEDURE — 99213 OFFICE O/P EST LOW 20 MIN: CPT | Mod: 25

## 2024-07-29 RX ORDER — HYALURONATE SODIUM 16.8MG/2ML
16.8 SYRINGE (ML) INTRAARTICULAR
Refills: 0 | Status: COMPLETED | OUTPATIENT
Start: 2024-07-29

## 2024-07-29 RX ADMIN — Medication MG/2ML: at 00:00

## 2024-07-29 NOTE — PROCEDURE
[Large Joint Injection] : Large joint injection [Bilateral] : bilaterally of the [Knee] : knee [Pain] : pain [Alcohol] : alcohol [Betadine] : betadine [Ethyl Chloride sprayed topically] : ethyl chloride sprayed topically [Sterile technique used] : sterile technique used [Gel-Syn (16.8mg)] : 16.8mg of Gel-Syn [#1] : series #1 [] : Patient tolerated procedure well [Patient was advised to rest the joint(s) for ____ days] : patient was advised to rest the joint(s) for [unfilled] days [Risks, benefits, alternatives discussed / Verbal consent obtained] : the risks benefits, and alternatives have been discussed, and verbal consent was obtained [All ultrasound images have been permanently captured and stored accordingly in our picture archiving and communication system] : All ultrasound images have been permanently captured and stored accordingly in our picture archiving and communication system [de-identified] : To ensure intra-articular injections [FreeTextEntry3] : Do not submerge underwater for 24 hours

## 2024-07-29 NOTE — DISCUSSION/SUMMARY
[Medication Risks Reviewed] : Medication risks reviewed [de-identified] : Ice p.r.n. for her knees Moist heat prn for her hip Continue meloxicam 15 mg q.d. with food p.r.n. Risk benefits and alternatives of prescribed medication(s) were discussed with the patient.  Side effects were discussed and questions were answered.  Discontinue medication if any issues.  To call me if any questions or concerns Continue home exercises She did not want to do formal PT I discussed repeat Gelsyn injections for both knees.  Risks including infection, swelling, stiffness, bleeding in addition to other associated risks with joint injection, benefits and alternatives were discussed with the patient She would like to do this The patient is going to be going to [place] September 5  Impression: Abductor tendinitis right Mild to moderate osteoarthritis right knee/chondromalacia patella.  Status post Oats procedure 2013 Mild to moderate osteoarthritis left knee/chondromalacia patella/tear medial meniscus

## 2024-07-29 NOTE — HISTORY OF PRESENT ILLNESS
[Gradual] : gradual [10] : 10 [Dull/Aching] : dull/aching [Sharp] : sharp [Intermittent] : intermittent [Injection therapy] : injection therapy [1] : 1 [Gelsyn] : Gelsyn [de-identified] : The patient has continued pain in both knees.  The left knee bothers her more than the right.  She has mild pain standing and walking.  Mild to moderate pain with stairs and movie sign.   The patient says her left hip is okay.  She has occasional pain right lateral hip after playing tennis.  No back pain.  No awakening from sleep at night [] : Post Surgical Visit: no [FreeTextEntry7] : R Leg [de-identified] : 5/09/24 [de-identified] : Dr. Payan [de-identified] : 11/16/2023 [de-identified] : [facility]

## 2024-07-29 NOTE — IMAGING
[Left] : left hip with pelvis [de-identified] : Normal gait  Lumbosacral spine Inspection-normal Tenderness-no tenderness Straight leg raising-negative bilaterally  Right hip Full painless passive range of motion. No tenderness  Left hip Full painless passive range of motion.  No tenderness   Right knee No swelling Mild medial facet and joint line tenderness.  Mild lateral joint line tenderness.   Passive range of motion 0 to 125 degrees  Left knee No swelling Mild medial facet.  Mild medial lateral joint line tenderness Passive range of motion 0 to 125 degrees   Both legs No swelling Calves are soft and nontender Posterior tibial pulse 2+ bilaterally  [FreeTextEntry9] : Reviewed and interpreted.  Right knee AP standing, lateral, sunrise views-mild to moderate tricompartmental degenerative changes  Reviewed and interpreted.  Left knee AP standing, lateral, sunrise views-mild to moderate tricompartmental degenerative changes

## 2024-08-05 ENCOUNTER — APPOINTMENT (OUTPATIENT)
Dept: ORTHOPEDIC SURGERY | Facility: CLINIC | Age: 59
End: 2024-08-05

## 2024-08-05 PROCEDURE — 20611 DRAIN/INJ JOINT/BURSA W/US: CPT | Mod: 50

## 2024-08-05 NOTE — PROCEDURE
[Large Joint Injection] : Large joint injection [Bilateral] : bilaterally of the [Knee] : knee [Pain] : pain [Alcohol] : alcohol [Betadine] : betadine [Ethyl Chloride sprayed topically] : ethyl chloride sprayed topically [Sterile technique used] : sterile technique used [Gel-Syn (16.8mg)] : 16.8mg of Gel-Syn [#1] : series #1 [] : Patient tolerated procedure well [Patient was advised to rest the joint(s) for ____ days] : patient was advised to rest the joint(s) for [unfilled] days [Risks, benefits, alternatives discussed / Verbal consent obtained] : the risks benefits, and alternatives have been discussed, and verbal consent was obtained [All ultrasound images have been permanently captured and stored accordingly in our picture archiving and communication system] : All ultrasound images have been permanently captured and stored accordingly in our picture archiving and communication system [de-identified] : To ensure intra-articular injections [FreeTextEntry3] : Do not submerge underwater for 24 hours

## 2024-08-05 NOTE — PHYSICAL EXAM
[Normal DTR UE/LE] : normal DTR UE/LE  [Able to Communicate] : able to communicate [Well Developed] : well developed [Well Nourished] : well nourished [Bilateral] : knee bilaterally [NL (0)] : extension 0 degrees [] : non-antalgic [TWNoteComboBox7] : flexion 130 degrees

## 2024-08-05 NOTE — HISTORY OF PRESENT ILLNESS
[Gradual] : gradual [10] : 10 [Dull/Aching] : dull/aching [Sharp] : sharp [Intermittent] : intermittent [Injection therapy] : injection therapy [2] : 2 [Gelsyn] : Gelsyn [de-identified] : The patient has continued pain in both knees.  The left knee bothers her more than the right.  She has mild pain standing and walking.  Mild to moderate pain with stairs and movie sign.   The patient says her left hip is okay.  She has occasional pain right lateral hip after playing tennis.  No back pain.  No awakening from sleep at night [] : Post Surgical Visit: no [FreeTextEntry7] : R Leg [de-identified] : 5/09/24 [de-identified] : Dr. Payan [de-identified] : 11/16/2023 [de-identified] : [facility]

## 2024-08-05 NOTE — IMAGING
[Left] : left hip with pelvis [de-identified] : Normal gait  Lumbosacral spine Inspection-normal Tenderness-no tenderness Straight leg raising-negative bilaterally  Right hip Full painless passive range of motion. No tenderness  Left hip Full painless passive range of motion.  No tenderness   Right knee No swelling Mild medial facet and joint line tenderness.  Mild lateral joint line tenderness.   Passive range of motion 0 to 125 degrees  Left knee No swelling Mild medial facet.  Mild medial lateral joint line tenderness Passive range of motion 0 to 125 degrees   Both legs No swelling Calves are soft and nontender Posterior tibial pulse 2+ bilaterally  [FreeTextEntry9] : Reviewed and interpreted.  Right knee AP standing, lateral, sunrise views-mild to moderate tricompartmental degenerative changes  Reviewed and interpreted.  Left knee AP standing, lateral, sunrise views-mild to moderate tricompartmental degenerative changes

## 2024-08-12 ENCOUNTER — APPOINTMENT (OUTPATIENT)
Dept: ORTHOPEDIC SURGERY | Facility: CLINIC | Age: 59
End: 2024-08-12
Payer: COMMERCIAL

## 2024-08-12 VITALS — HEIGHT: 63 IN | WEIGHT: 120 LBS | BODY MASS INDEX: 21.26 KG/M2

## 2024-08-12 DIAGNOSIS — M17.0 BILATERAL PRIMARY OSTEOARTHRITIS OF KNEE: ICD-10-CM

## 2024-08-12 PROCEDURE — 20611 DRAIN/INJ JOINT/BURSA W/US: CPT | Mod: 50

## 2024-08-12 RX ORDER — HYALURONATE SODIUM 16.8MG/2ML
16.8 SYRINGE (ML) INTRAARTICULAR
Refills: 0 | Status: COMPLETED | OUTPATIENT
Start: 2024-08-12

## 2024-08-12 RX ADMIN — Medication MG/2ML: at 00:00

## 2024-08-12 NOTE — DISCUSSION/SUMMARY
[Medication Risks Reviewed] : Medication risks reviewed [de-identified] : Ice p.r.n. for her knees Moist heat prn for her hip Continue meloxicam 15 mg q.d. with food p.r.n. Risk benefits and alternatives of prescribed medication(s) were discussed with the patient.  Side effects were discussed and questions were answered.  Discontinue medication if any issues.  To call me if any questions or concerns She says she has screening blood work by her primary care physician on a regular basis Continue home exercises The patient is going to be going to [place] September 5  Impression: Abductor tendinitis right Mild to moderate osteoarthritis right knee/chondromalacia patella.  Status post Oats procedure 2013 Mild to moderate osteoarthritis left knee/chondromalacia patella/tear medial meniscus

## 2024-08-12 NOTE — HISTORY OF PRESENT ILLNESS
[Gradual] : gradual [10] : 10 [Dull/Aching] : dull/aching [Sharp] : sharp [Intermittent] : intermittent [Injection therapy] : injection therapy [3] : 3 [Gelsyn] : Gelsyn [de-identified] : The patient feels better after the second Gelsyn injections in both knees.  She has mild occasional pain standing and walking [] : no [FreeTextEntry7] : R Leg [de-identified] : 5/09/24 [de-identified] : Dr. Payan [de-identified] : 8/5/2024 [de-identified] : [facility]

## 2024-08-12 NOTE — PROCEDURE
[Large Joint Injection] : Large joint injection [Bilateral] : bilaterally of the [Knee] : knee [Pain] : pain [Alcohol] : alcohol [Betadine] : betadine [Ethyl Chloride sprayed topically] : ethyl chloride sprayed topically [Sterile technique used] : sterile technique used [Gel-Syn (16.8mg)] : 16.8mg of Gel-Syn [] : Patient tolerated procedure well [Patient was advised to rest the joint(s) for ____ days] : patient was advised to rest the joint(s) for [unfilled] days [Risks, benefits, alternatives discussed / Verbal consent obtained] : the risks benefits, and alternatives have been discussed, and verbal consent was obtained [All ultrasound images have been permanently captured and stored accordingly in our picture archiving and communication system] : All ultrasound images have been permanently captured and stored accordingly in our picture archiving and communication system [#3] : series #3 [de-identified] : To ensure intra-articular injections [FreeTextEntry3] : Do not submerge underwater for 24 hours

## 2024-08-12 NOTE — IMAGING
[FreeTextEntry9] : Reviewed and interpreted.  Right knee AP standing, lateral, sunrise views-mild to moderate tricompartmental degenerative changes  Reviewed and interpreted.  Left knee AP standing, lateral, sunrise views-mild to moderate tricompartmental degenerative changes [de-identified] : Normal gait  Right knee No swelling Mild medial facet and joint line tenderness.  Mild lateral joint line tenderness.   Passive range of motion 0 to 125 degrees  Left knee No swelling Mild medial facet.  Mild medial lateral joint line tenderness Passive range of motion 0 to 125 degrees   Both legs No swelling Calves are soft and nontender

## 2025-01-25 ENCOUNTER — EMERGENCY (EMERGENCY)
Facility: HOSPITAL | Age: 60
LOS: 0 days | Discharge: HOME | End: 2025-01-25
Attending: HOSPITALIST
Payer: COMMERCIAL

## 2025-01-25 VITALS
HEART RATE: 82 BPM | SYSTOLIC BLOOD PRESSURE: 133 MMHG | DIASTOLIC BLOOD PRESSURE: 61 MMHG | OXYGEN SATURATION: 99 % | TEMPERATURE: 98 F | RESPIRATION RATE: 16 BRPM

## 2025-01-25 VITALS
HEART RATE: 98 BPM | HEIGHT: 63 IN | TEMPERATURE: 98 F | DIASTOLIC BLOOD PRESSURE: 74 MMHG | OXYGEN SATURATION: 98 % | SYSTOLIC BLOOD PRESSURE: 124 MMHG | WEIGHT: 130.07 LBS | RESPIRATION RATE: 20 BRPM

## 2025-01-25 DIAGNOSIS — R19.7 DIARRHEA, UNSPECIFIED: ICD-10-CM

## 2025-01-25 DIAGNOSIS — R05.9 COUGH, UNSPECIFIED: ICD-10-CM

## 2025-01-25 DIAGNOSIS — R11.2 NAUSEA WITH VOMITING, UNSPECIFIED: ICD-10-CM

## 2025-01-25 DIAGNOSIS — B34.9 VIRAL INFECTION, UNSPECIFIED: ICD-10-CM

## 2025-01-25 DIAGNOSIS — J10.1 INFLUENZA DUE TO OTHER IDENTIFIED INFLUENZA VIRUS WITH OTHER RESPIRATORY MANIFESTATIONS: ICD-10-CM

## 2025-01-25 LAB
ALBUMIN SERPL ELPH-MCNC: 3.6 G/DL — SIGNIFICANT CHANGE UP (ref 3.3–5)
ALP SERPL-CCNC: 67 U/L — SIGNIFICANT CHANGE UP (ref 40–120)
ALT FLD-CCNC: 27 U/L — SIGNIFICANT CHANGE UP (ref 12–78)
ANION GAP SERPL CALC-SCNC: 10 MMOL/L — SIGNIFICANT CHANGE UP (ref 5–17)
AST SERPL-CCNC: 26 U/L — SIGNIFICANT CHANGE UP (ref 15–37)
BASOPHILS # BLD AUTO: 0.03 K/UL — SIGNIFICANT CHANGE UP (ref 0–0.2)
BASOPHILS NFR BLD AUTO: 0.4 % — SIGNIFICANT CHANGE UP (ref 0–2)
BILIRUB SERPL-MCNC: 0.7 MG/DL — SIGNIFICANT CHANGE UP (ref 0.2–1.2)
BUN SERPL-MCNC: 18 MG/DL — SIGNIFICANT CHANGE UP (ref 7–23)
CALCIUM SERPL-MCNC: 8.8 MG/DL — SIGNIFICANT CHANGE UP (ref 8.5–10.1)
CHLORIDE SERPL-SCNC: 107 MMOL/L — SIGNIFICANT CHANGE UP (ref 96–108)
CO2 SERPL-SCNC: 20 MMOL/L — LOW (ref 22–31)
CREAT SERPL-MCNC: 0.69 MG/DL — SIGNIFICANT CHANGE UP (ref 0.5–1.3)
EGFR: 99 ML/MIN/1.73M2 — SIGNIFICANT CHANGE UP
EGFR: 99 ML/MIN/1.73M2 — SIGNIFICANT CHANGE UP
EOSINOPHIL # BLD AUTO: 0.02 K/UL — SIGNIFICANT CHANGE UP (ref 0–0.5)
EOSINOPHIL NFR BLD AUTO: 0.3 % — SIGNIFICANT CHANGE UP (ref 0–6)
FLUAV AG NPH QL: DETECTED
FLUBV AG NPH QL: SIGNIFICANT CHANGE UP
GLUCOSE SERPL-MCNC: 95 MG/DL — SIGNIFICANT CHANGE UP (ref 70–99)
HCT VFR BLD CALC: 39.8 % — SIGNIFICANT CHANGE UP (ref 34.5–45)
HGB BLD-MCNC: 13.9 G/DL — SIGNIFICANT CHANGE UP (ref 11.5–15.5)
IMM GRANULOCYTES NFR BLD AUTO: 0.1 % — SIGNIFICANT CHANGE UP (ref 0–0.9)
LIDOCAIN IGE QN: 36 U/L — SIGNIFICANT CHANGE UP (ref 13–75)
LYMPHOCYTES # BLD AUTO: 1.14 K/UL — SIGNIFICANT CHANGE UP (ref 1–3.3)
LYMPHOCYTES # BLD AUTO: 16.4 % — SIGNIFICANT CHANGE UP (ref 13–44)
MCHC RBC-ENTMCNC: 31.5 PG — SIGNIFICANT CHANGE UP (ref 27–34)
MCHC RBC-ENTMCNC: 34.9 G/DL — SIGNIFICANT CHANGE UP (ref 32–36)
MCV RBC AUTO: 90.2 FL — SIGNIFICANT CHANGE UP (ref 80–100)
MONOCYTES # BLD AUTO: 0.62 K/UL — SIGNIFICANT CHANGE UP (ref 0–0.9)
MONOCYTES NFR BLD AUTO: 8.9 % — SIGNIFICANT CHANGE UP (ref 2–14)
NEUTROPHILS # BLD AUTO: 5.13 K/UL — SIGNIFICANT CHANGE UP (ref 1.8–7.4)
NEUTROPHILS NFR BLD AUTO: 73.9 % — SIGNIFICANT CHANGE UP (ref 43–77)
PLATELET # BLD AUTO: 231 K/UL — SIGNIFICANT CHANGE UP (ref 150–400)
POTASSIUM SERPL-MCNC: 3.5 MMOL/L — SIGNIFICANT CHANGE UP (ref 3.5–5.3)
POTASSIUM SERPL-SCNC: 3.5 MMOL/L — SIGNIFICANT CHANGE UP (ref 3.5–5.3)
PROT SERPL-MCNC: 7 GM/DL — SIGNIFICANT CHANGE UP (ref 6–8.3)
RBC # BLD: 4.41 M/UL — SIGNIFICANT CHANGE UP (ref 3.8–5.2)
RBC # FLD: 11.6 % — SIGNIFICANT CHANGE UP (ref 10.3–14.5)
RSV RNA NPH QL NAA+NON-PROBE: SIGNIFICANT CHANGE UP
SARS-COV-2 RNA SPEC QL NAA+PROBE: SIGNIFICANT CHANGE UP
SODIUM SERPL-SCNC: 137 MMOL/L — SIGNIFICANT CHANGE UP (ref 135–145)
WBC # BLD: 6.95 K/UL — SIGNIFICANT CHANGE UP (ref 3.8–10.5)
WBC # FLD AUTO: 6.95 K/UL — SIGNIFICANT CHANGE UP (ref 3.8–10.5)

## 2025-01-25 PROCEDURE — 36415 COLL VENOUS BLD VENIPUNCTURE: CPT

## 2025-01-25 PROCEDURE — 0241U: CPT

## 2025-01-25 PROCEDURE — 96374 THER/PROPH/DIAG INJ IV PUSH: CPT

## 2025-01-25 PROCEDURE — 83690 ASSAY OF LIPASE: CPT

## 2025-01-25 PROCEDURE — 85025 COMPLETE CBC W/AUTO DIFF WBC: CPT

## 2025-01-25 PROCEDURE — 93005 ELECTROCARDIOGRAM TRACING: CPT

## 2025-01-25 PROCEDURE — 99284 EMERGENCY DEPT VISIT MOD MDM: CPT | Mod: 25

## 2025-01-25 PROCEDURE — 96375 TX/PRO/DX INJ NEW DRUG ADDON: CPT

## 2025-01-25 PROCEDURE — 93010 ELECTROCARDIOGRAM REPORT: CPT

## 2025-01-25 PROCEDURE — 96361 HYDRATE IV INFUSION ADD-ON: CPT

## 2025-01-25 PROCEDURE — 99284 EMERGENCY DEPT VISIT MOD MDM: CPT

## 2025-01-25 PROCEDURE — 80053 COMPREHEN METABOLIC PANEL: CPT

## 2025-01-25 PROCEDURE — 96376 TX/PRO/DX INJ SAME DRUG ADON: CPT

## 2025-01-25 RX ORDER — OSELTAMIVIR PHOSPHATE 75 MG/1
1 CAPSULE ORAL
Qty: 10 | Refills: 0
Start: 2025-01-25 | End: 2025-01-29

## 2025-01-25 RX ORDER — KETOROLAC TROMETHAMINE 30 MG/ML
30 INJECTION, SOLUTION INTRAMUSCULAR; INTRAVENOUS ONCE
Refills: 0 | Status: DISCONTINUED | OUTPATIENT
Start: 2025-01-25 | End: 2025-01-25

## 2025-01-25 RX ORDER — ONDANSETRON HCL/PF 4 MG/2 ML
1 VIAL (ML) INJECTION
Qty: 9 | Refills: 0
Start: 2025-01-25 | End: 2025-01-27

## 2025-01-25 RX ORDER — ONDANSETRON HCL/PF 4 MG/2 ML
4 VIAL (ML) INJECTION ONCE
Refills: 0 | Status: COMPLETED | OUTPATIENT
Start: 2025-01-25 | End: 2025-01-25

## 2025-01-25 RX ADMIN — Medication 1000 MILLILITER(S): at 07:15

## 2025-01-25 RX ADMIN — Medication 4 MILLIGRAM(S): at 06:07

## 2025-01-25 RX ADMIN — Medication 1000 MILLILITER(S): at 09:26

## 2025-01-25 RX ADMIN — Medication 4 MILLIGRAM(S): at 08:22

## 2025-01-25 RX ADMIN — Medication 1000 MILLILITER(S): at 06:07

## 2025-01-25 RX ADMIN — KETOROLAC TROMETHAMINE 30 MILLIGRAM(S): 30 INJECTION, SOLUTION INTRAMUSCULAR; INTRAVENOUS at 06:22

## 2025-02-01 ENCOUNTER — RX RENEWAL (OUTPATIENT)
Age: 60
End: 2025-02-01

## 2025-05-06 ENCOUNTER — NON-APPOINTMENT (OUTPATIENT)
Age: 60
End: 2025-05-06

## 2025-07-16 PROBLEM — Z78.9 OTHER SPECIFIED HEALTH STATUS: Chronic | Status: ACTIVE | Noted: 2025-01-25

## 2025-08-20 ENCOUNTER — APPOINTMENT (OUTPATIENT)
Dept: ORTHOPEDIC SURGERY | Facility: CLINIC | Age: 60
End: 2025-08-20